# Patient Record
Sex: MALE | Race: BLACK OR AFRICAN AMERICAN | Employment: OTHER | URBAN - METROPOLITAN AREA
[De-identification: names, ages, dates, MRNs, and addresses within clinical notes are randomized per-mention and may not be internally consistent; named-entity substitution may affect disease eponyms.]

---

## 2024-09-02 ENCOUNTER — HOSPITAL ENCOUNTER (EMERGENCY)
Facility: HOSPITAL | Age: 54
Discharge: HOME | End: 2024-09-02
Attending: EMERGENCY MEDICINE

## 2024-09-02 VITALS
OXYGEN SATURATION: 95 % | DIASTOLIC BLOOD PRESSURE: 87 MMHG | SYSTOLIC BLOOD PRESSURE: 112 MMHG | HEART RATE: 64 BPM | TEMPERATURE: 98.1 F | BODY MASS INDEX: 27.09 KG/M2 | RESPIRATION RATE: 18 BRPM | HEIGHT: 72 IN | WEIGHT: 200 LBS

## 2024-09-02 DIAGNOSIS — G40.909 NONINTRACTABLE EPILEPSY WITHOUT STATUS EPILEPTICUS, UNSPECIFIED EPILEPSY TYPE (HCC): ICD-10-CM

## 2024-09-02 DIAGNOSIS — Z86.73 HISTORY OF COMPLETED STROKE: ICD-10-CM

## 2024-09-02 DIAGNOSIS — G40.919 BREAKTHROUGH SEIZURE (HCC): Primary | ICD-10-CM

## 2024-09-02 LAB
COMMENT:: NORMAL
SPECIMEN HOLD: NORMAL

## 2024-09-02 PROCEDURE — 93005 ELECTROCARDIOGRAM TRACING: CPT | Performed by: EMERGENCY MEDICINE

## 2024-09-02 PROCEDURE — 99283 EMERGENCY DEPT VISIT LOW MDM: CPT

## 2024-09-02 RX ORDER — LEVETIRACETAM 500 MG/1
1000 TABLET ORAL 2 TIMES DAILY
COMMUNITY
Start: 2024-02-21 | End: 2024-09-02

## 2024-09-02 RX ORDER — OXCARBAZEPINE 300 MG/1
300 TABLET, FILM COATED ORAL 2 TIMES DAILY
COMMUNITY
Start: 2024-05-22

## 2024-09-02 ASSESSMENT — LIFESTYLE VARIABLES
HOW OFTEN DO YOU HAVE A DRINK CONTAINING ALCOHOL: NEVER
HOW MANY STANDARD DRINKS CONTAINING ALCOHOL DO YOU HAVE ON A TYPICAL DAY: PATIENT DOES NOT DRINK

## 2024-09-02 ASSESSMENT — PAIN - FUNCTIONAL ASSESSMENT: PAIN_FUNCTIONAL_ASSESSMENT: NONE - DENIES PAIN

## 2024-09-02 NOTE — ED PROVIDER NOTES
[facility] [place] EMERGENCY DEPT  EMERGENCY DEPARTMENT ENCOUNTER      Pt Name: Jacobo Bacon  MRN: [id number]  Birthdate 1970  Date of evaluation: 9/2/2024  Provider: Cam Boyer MD    CHIEF COMPLAINT       Chief Complaint   Patient presents with    Seizures         HISTORY OF PRESENT ILLNESS   (Location/Symptom, Timing/Onset, Context/Setting, Quality, Duration, Modifying Factors, Severity)  Note limiting factors.   HPI      Review of External Medical Records:     Nursing Notes were reviewed.    REVIEW OF SYSTEMS    (2-9 systems for level 4, 10 or more for level 5)     Review of Systems    Except as noted above the remainder of the review of systems was reviewed and negative.       PAST MEDICAL HISTORY   No past medical history on file.      SURGICAL HISTORY     No past surgical history on file.      CURRENT MEDICATIONS       Previous Medications    No medications on file       ALLERGIES     Shellfish-derived products    FAMILY HISTORY     No family history on file.       SOCIAL HISTORY              PHYSICAL EXAM    (up to 7 for level 4, 8 or more for level 5)     ED Triage Vitals [09/02/24 1132]   BP Systolic BP Percentile Diastolic BP Percentile Temp Temp Source Pulse Respirations SpO2   (!) 159/114 -- -- 98.1 °F (36.7 °C) Oral 90 18 100 %      Height Weight - Scale         1.829 m (6') 90.7 kg (200 lb)             Body mass index is 27.12 kg/m².    Physical Exam    DIAGNOSTIC RESULTS     EKG:   All EKG's are interpreted by an Emergency Department Physician who either signs or Co-signs this chart in the absence of a cardiologist. Interpretation provided in ED course below    RADIOLOGY:   Non-plain film images such as CT, Ultrasound and MRI are read by the radiologist. Plain radiographic images are visualized and preliminarily interpreted by the emergency physician with the findings as noted in the ED course.    Interpretation per the Radiologist below, if available at the time of this note:    No orders to

## 2024-09-02 NOTE — ED TRIAGE NOTES
Patient arrives to ed via EMS. Per ems pt had witnessed seizure at home and was given intranasal versed 10mg by wife when riding in the car when seizure happened.     Dx with epilepsy in July when seizure last happened. . 18G L arm.     Pt alert and oriented on arrival. Appears to be drowsy. Denies pain or any complaints att.

## 2024-09-04 LAB
EKG ATRIAL RATE: 88 BPM
EKG DIAGNOSIS: NORMAL
EKG P AXIS: 24 DEGREES
EKG P-R INTERVAL: 156 MS
EKG Q-T INTERVAL: 362 MS
EKG QRS DURATION: 96 MS
EKG QTC CALCULATION (BAZETT): 438 MS
EKG R AXIS: 34 DEGREES
EKG T AXIS: 20 DEGREES
EKG VENTRICULAR RATE: 88 BPM

## 2024-09-04 PROCEDURE — 93010 ELECTROCARDIOGRAM REPORT: CPT | Performed by: SPECIALIST
